# Patient Record
Sex: MALE | Race: WHITE | URBAN - METROPOLITAN AREA
[De-identification: names, ages, dates, MRNs, and addresses within clinical notes are randomized per-mention and may not be internally consistent; named-entity substitution may affect disease eponyms.]

---

## 2019-06-24 ENCOUNTER — HOSPITAL ENCOUNTER (OUTPATIENT)
Age: 34
Setting detail: SPECIMEN
Discharge: HOME | End: 2019-06-24
Payer: COMMERCIAL

## 2019-06-27 LAB — SURGICAL PATHOLOGY REPORT: NORMAL

## 2021-09-07 ENCOUNTER — HOSPITAL ENCOUNTER (OUTPATIENT)
Age: 36
Setting detail: SPECIMEN
Discharge: HOME | End: 2021-09-07
Payer: MEDICARE

## 2021-09-07 ENCOUNTER — OFFICE VISIT (OUTPATIENT)
Dept: FAMILY MEDICINE CLINIC | Age: 36
End: 2021-09-07
Payer: MEDICARE

## 2021-09-07 VITALS — HEART RATE: 58 BPM | RESPIRATION RATE: 16 BRPM | TEMPERATURE: 97.8 F | OXYGEN SATURATION: 100 %

## 2021-09-07 DIAGNOSIS — R05.9 COUGH: ICD-10-CM

## 2021-09-07 DIAGNOSIS — Z20.822 EXPOSURE TO COVID-19 VIRUS: Primary | ICD-10-CM

## 2021-09-07 DIAGNOSIS — Z20.822 EXPOSURE TO COVID-19 VIRUS: ICD-10-CM

## 2021-09-07 DIAGNOSIS — R09.89 RUNNY NOSE: ICD-10-CM

## 2021-09-07 PROCEDURE — 1036F TOBACCO NON-USER: CPT | Performed by: NURSE PRACTITIONER

## 2021-09-07 PROCEDURE — G8427 DOCREV CUR MEDS BY ELIG CLIN: HCPCS | Performed by: NURSE PRACTITIONER

## 2021-09-07 PROCEDURE — 99202 OFFICE O/P NEW SF 15 MIN: CPT | Performed by: NURSE PRACTITIONER

## 2021-09-07 PROCEDURE — G8421 BMI NOT CALCULATED: HCPCS | Performed by: NURSE PRACTITIONER

## 2021-09-07 ASSESSMENT — ENCOUNTER SYMPTOMS
VOMITING: 0
COUGH: 1
CHEST TIGHTNESS: 0
SORE THROAT: 0
NAUSEA: 0
EYE PAIN: 0
RHINORRHEA: 1
SHORTNESS OF BREATH: 0

## 2021-09-07 ASSESSMENT — PATIENT HEALTH QUESTIONNAIRE - PHQ9
SUM OF ALL RESPONSES TO PHQ9 QUESTIONS 1 & 2: 0
SUM OF ALL RESPONSES TO PHQ QUESTIONS 1-9: 0
SUM OF ALL RESPONSES TO PHQ QUESTIONS 1-9: 0
1. LITTLE INTEREST OR PLEASURE IN DOING THINGS: 0
2. FEELING DOWN, DEPRESSED OR HOPELESS: 0
SUM OF ALL RESPONSES TO PHQ QUESTIONS 1-9: 0

## 2021-09-07 NOTE — PATIENT INSTRUCTIONS
Patient Education        Learning About Coronavirus [phone]  What is coronavirus (COVID-19)? COVID-19 is a disease caused by a new type of coronavirus. This illness was first found in December 2019. It has since spread worldwide. Coronaviruses are a large group of viruses. They cause the common cold. They also cause more serious illnesses like Middle East respiratory syndrome (MERS) and severe acute respiratory syndrome (SARS). COVID-19 is caused by a novel coronavirus. That means it's a new type that has not been seen in people before. What are the symptoms? Coronavirus (COVID-19) symptoms may include:  · Fever. · Cough. · Trouble breathing. · Chills or repeated shaking with chills. · Muscle pain. · Headache. · Sore throat. · New loss of taste or smell. · Vomiting. · Diarrhea. In severe cases, COVID-19 can cause pneumonia and make it hard to breathe without help from a machine. It can cause death. How is it diagnosed? COVID-19 is diagnosed with a viral test. This may also be called a PCR test or antigen test. It looks for evidence of the virus in your breathing passages or lungs (respiratory system). The test is most often done on a sample from the nose, throat, or lungs. It's sometimes done on a sample of saliva. One way a sample is collected is by putting a long swab into the back of your nose. How is it treated? Mild cases of COVID-19 can be treated at home. Serious cases need treatment in the hospital. Treatment may include medicines to reduce symptoms, plus breathing support such as oxygen therapy or a ventilator. Some people may be placed on their belly to help their oxygen levels. Treatments that may help people who have COVID-19 include:  Antiviral medicines. These medicines treat viral infections. Remdesivir is an example. Immune-based therapy. These medicines help the immune system fight COVID-19. One example is bamlanivimab. It's a monoclonal antibody. Blood thinners. breathing. (You can't talk at all.)     · You have constant chest pain or pressure.     · You are severely dizzy or lightheaded.     · You are confused or can't think clearly.     · Your face and lips have a blue color.     · You pass out (lose consciousness) or are very hard to wake up. Call your doctor now or seek immediate medical care if:    · You have moderate trouble breathing. (You can't speak a full sentence.)     · You are coughing up blood (more than about 1 teaspoon).     · You have signs of low blood pressure. These include feeling lightheaded; being too weak to stand; and having cold, pale, clammy skin. Watch closely for changes in your health, and be sure to contact your doctor if:    · Your symptoms get worse.     · You are not getting better as expected. Call before you go to the doctor's office. Follow their instructions. And wear a cloth face cover. Current as of: March 26, 2021               Content Version: 12.9  © 2006-2021 [employer]. Care instructions adapted under license by [facility] ([facility]). If you have questions about a medical condition or this instruction, always ask your healthcare professional. [place] [address] any warranty or liability for your use of this information.

## 2021-09-07 NOTE — PROGRESS NOTES
[address] [place] [address] Drive [facility]  [facility] [address] [address] [place], [place] [id number]  Dept: [phone]  Dept Fax: [phone]    Marcie Sal is a 31 y.o. male who presents today for his medical conditions/complaints of   Chief Complaint   Patient presents with    Other     exposed to covid-19          HPI:     Pulse 58   Temp 97.8 °F (36.6 °C) (Temporal)   Resp 16   SpO2 100%       HPI  Pt presented to the clinic today with c/o runny  nose. This is a new problem. The current episode started 2 days ago. The problem has been unchanged since onset. Associated symptoms include: cough- dry, headache . Pertinent negatives include: No fever, SOB, chest pain, abdominal pain, loss of taste, smell . Pt has tried Tylenol with  improvement. Vaccinated for COVID:  NO  No history of COVID  Exposed to covid: yes    No past medical history on file. No past surgical history on file. No family history on file. Social History     Tobacco Use    Smoking status: Never Smoker    Smokeless tobacco: Never Used   Substance Use Topics    Alcohol use: Not on file        Prior to Visit Medications    Not on File       No Known Allergies      Subjective:      Review of Systems   Constitutional: Negative for chills and fever. HENT: Positive for rhinorrhea. Negative for congestion, ear pain and sore throat. Eyes: Negative for pain and visual disturbance. Respiratory: Positive for cough. Negative for chest tightness and shortness of breath. Cardiovascular: Negative for chest pain, palpitations and leg swelling. Gastrointestinal: Negative for nausea and vomiting. Genitourinary: Negative for decreased urine volume and difficulty urinating. Musculoskeletal: Negative for gait problem, myalgias and neck pain. Skin: Negative for pallor and rash. Neurological: Positive for headaches. Negative for weakness and light-headedness. Psychiatric/Behavioral: Negative for sleep disturbance. Objective:     Physical Exam  Vitals and nursing note reviewed. Constitutional:       General: He is not in acute distress. Appearance: Normal appearance. HENT:      Head: Normocephalic and atraumatic. Right Ear: Tympanic membrane and ear canal normal.      Left Ear: Tympanic membrane and ear canal normal.      Nose: Rhinorrhea present. Mouth/Throat:      Lips: Pink. Mouth: Mucous membranes are moist.      Pharynx: Oropharynx is clear. Uvula midline. Eyes:      Extraocular Movements: Extraocular movements intact. Conjunctiva/sclera: Conjunctivae normal.   Cardiovascular:      Rate and Rhythm: Normal rate and regular rhythm. Pulses: Normal pulses. Pulmonary:      Effort: Pulmonary effort is normal. No tachypnea. Breath sounds: Normal breath sounds. Abdominal:      General: Bowel sounds are normal.      Palpations: Abdomen is soft. Musculoskeletal:         General: Normal range of motion. Cervical back: Normal range of motion and neck supple. Right lower leg: No edema. Left lower leg: No edema. Skin:     General: Skin is warm and dry. Capillary Refill: Capillary refill takes less than 2 seconds. Findings: No rash. Neurological:      Mental Status: He is alert and oriented to person, place, and time. Coordination: Coordination normal.      Gait: Gait normal.   Psychiatric:         Mood and Affect: Mood normal.         Thought Content: Thought content normal.           MEDICAL DECISION MAKING Assessment/Plan:     Joneppradha Luna was seen today for other.     Diagnoses and all orders for this visit:    Exposure to COVID-19 virus  -     COVID-19; Future    Runny nose    Cough        Results for orders placed or performed during the hospital encounter of 06/24/19   SURGICAL PATHOLOGY REPORT   Result Value Ref Range    Surgical Pathology Report       [address] [facility]. [place], [address]  [phone]  Fax: [phone] [facility]     Patient Name: Souleymane Piña: [id number]  Path Number: Marilynn Rubins: 6/24/2019  Received: 6/27/2019  Reported: 6/27/2019 14:48    -- Diagnosis --  1. DUODENUM, BIOPSIES:       -  NORMAL DUODENAL MUCOSA. 2. STOMACH, BIOPSIES:       -  HELICOBACTER-ASSOCIATED MODERATE CHRONIC ACTIVE GASTRITIS.       -  NEGATIVE FOR INTESTINAL METAPLASIA OR DYSPLASIA. Nick Bowman M.D.  **Electronically Signed Out**         jet/6/27/2019       Clinical Information  Pre-op Diagnosis:   EPI PAIN  Operation Performed:   DUODENAL BIOPSY, GASTRIC BIOPSY  Clin. Req.:  H&E, GIEMSA      Source of Specimen  1: DUODENAL BX  2: GASTRIC BX    Gross Description  1. \"DUODENAL BX\" Received in formalin, submitted and processed at [address] ar e 1 tan fragments  each measuring 0.1 x 0.1 x 0.1 cm in greatest dimension. Entirely  submitted 1cs. Received at [facility] is 1 slide labeled \"[id number],  Balwinder Garcia. \"   2. \"GASTRIC BX\" Received in formalin, submitted and processed at [address] are 1 gray fragments  each measuring 0.1 x 0.1 x 0.1 cm in greatest dimension. Entirely  submitted 1cs. Received at [facility] are 2 slides labeled \"[id number],  Balwinder Garcia. \"    dw           Microscopic Description  1. Sections of duodenal mucosa show preservation of the villous  architecture. The lamina propria contains the usual lymphocytes and  plasma cells. There is no evidence of neutrophilic inflammation,  granuloma formation or dysplasia. 2. Sections show gastric mucosa with moderate neutrophilic  inflammation in the lamina propria and gastric pits, and a  lymphoplasmacytic infiltrate. There is no evidence of intestinal  metaplasia or dysplasia.   The provided Giemsa stain performed at  [facility] highlights organisms along the surface mucosa. Controls stain appropriately. Based on the history and exam, I suspect viral illness. Will send out COVID19 testing. Possible treatment alterations based on the results. Patient instructed to self-quarantine until testing results are back- and to follow the quarantine instructions in the after visit summary. Tylenol / Motrin as directed on the bottle as needed for fever/pain. Increase fluids, rest.   The patient indicates understanding of these issues and agrees with the plan. Educational materials provided on AVS.  Follow up if symptoms do not improve/worsen. Call with any questions or concerns. Discussed symptoms that will warrant urgent ED evaluation/treatment. Preventing the Spread of Coronavirus Disease 2019 in Homes and Residential Communities: For the most recent information go to: [employer]    Patient given educational materials - see patientinstructions. Discussed use, benefit, and side effects of prescribed medications. All patient questions answered. Pt verbalized understanding. Instructed to continue current medications, diet and exercise. Patient agreed with treatment plan. Follow up as directed.      Electronically signed by SADAF Lobato CNP on 9/7/2021 at 7:22 PM

## 2021-09-08 LAB
SARS-COV-2: NORMAL
SARS-COV-2: NOT DETECTED
SOURCE: NORMAL